# Patient Record
Sex: FEMALE | URBAN - METROPOLITAN AREA
[De-identification: names, ages, dates, MRNs, and addresses within clinical notes are randomized per-mention and may not be internally consistent; named-entity substitution may affect disease eponyms.]

---

## 2019-02-05 ENCOUNTER — APPOINTMENT (RX ONLY)
Dept: URBAN - METROPOLITAN AREA CLINIC 151 | Facility: CLINIC | Age: 74
Setting detail: DERMATOLOGY
End: 2019-02-05

## 2019-02-05 DIAGNOSIS — L43.8 OTHER LICHEN PLANUS: ICD-10-CM

## 2019-02-05 DIAGNOSIS — L64.8 OTHER ANDROGENIC ALOPECIA: ICD-10-CM

## 2019-02-05 DIAGNOSIS — L82.1 OTHER SEBORRHEIC KERATOSIS: ICD-10-CM

## 2019-02-05 PROBLEM — L85.3 XEROSIS CUTIS: Status: ACTIVE | Noted: 2019-02-05

## 2019-02-05 PROBLEM — D23.5 OTHER BENIGN NEOPLASM OF SKIN OF TRUNK: Status: ACTIVE | Noted: 2019-02-05

## 2019-02-05 PROBLEM — L29.8 OTHER PRURITUS: Status: ACTIVE | Noted: 2019-02-05

## 2019-02-05 PROBLEM — I10 ESSENTIAL (PRIMARY) HYPERTENSION: Status: ACTIVE | Noted: 2019-02-05

## 2019-02-05 PROCEDURE — ? LIQUID NITROGEN

## 2019-02-05 PROCEDURE — 17110 DESTRUCTION B9 LES UP TO 14: CPT

## 2019-02-05 PROCEDURE — ? COUNSELING

## 2019-02-05 PROCEDURE — 99203 OFFICE O/P NEW LOW 30 MIN: CPT | Mod: 25

## 2019-02-05 ASSESSMENT — LOCATION SIMPLE DESCRIPTION DERM
LOCATION SIMPLE: RIGHT POSTERIOR UPPER ARM
LOCATION SIMPLE: LEFT BUCCAL MUCOSA
LOCATION SIMPLE: RIGHT POSTERIOR UPPER ARM
LOCATION SIMPLE: RIGHT SCALP

## 2019-02-05 ASSESSMENT — LOCATION DETAILED DESCRIPTION DERM
LOCATION DETAILED: LEFT BUCCAL MUCOSA
LOCATION DETAILED: RIGHT MEDIAL FRONTAL SCALP
LOCATION DETAILED: RIGHT DISTAL POSTERIOR UPPER ARM
LOCATION DETAILED: RIGHT DISTAL POSTERIOR UPPER ARM

## 2019-02-05 ASSESSMENT — LOCATION ZONE DERM
LOCATION ZONE: SCALP
LOCATION ZONE: ARM
LOCATION ZONE: MUCOUS_MEMBRANE
LOCATION ZONE: ARM

## 2019-02-05 NOTE — HPI: EVALUATION OF SKIN LESION(S)
Hpi Title: Evaluation of Skin Lesions
Additional History: Suggested by Dr. Song.   - Left buccal cheek lesion that was biopsied as lichen planus - Gradual Hair loss s/p starting Leva-dopa for Parkinsons 3 years ago. Discussing with neurologist jason to discuss changing med.  - Lesion on left posterior forearm.

## 2019-02-05 NOTE — PROCEDURE: COUNSELING
Patient Specific Counseling (Will Not Stick From Patient To Patient): - Discussed treatment options. Since lesion is asymptomatic, I recommended that she does not need any medication at this time.
Detail Level: Detailed
Detail Level: Zone
Patient Specific Counseling (Will Not Stick From Patient To Patient): - Discussed that I doubt that it is related to her Levadopa-carbidopa medication\\n- Discussed that hair loss can be caused by stress \\n- Reassured patient that her hair loss is natural in the context of her age\\n- Recommended men’s strength Rogaine
Detail Level: Generalized

## 2019-07-24 ENCOUNTER — APPOINTMENT (RX ONLY)
Dept: URBAN - METROPOLITAN AREA CLINIC 151 | Facility: CLINIC | Age: 74
Setting detail: DERMATOLOGY
End: 2019-07-24

## 2019-07-24 DIAGNOSIS — T07XXXA INSECT BITE, NONVENOMOUS, OF OTHER, MULTIPLE, AND UNSPECIFIED SITES, WITHOUT MENTION OF INFECTION: ICD-10-CM

## 2019-07-24 PROBLEM — T07.XXXA UNSPECIFIED MULTIPLE INJURIES, INITIAL ENCOUNTER: Status: ACTIVE | Noted: 2019-07-24

## 2019-07-24 PROBLEM — D23.5 OTHER BENIGN NEOPLASM OF SKIN OF TRUNK: Status: ACTIVE | Noted: 2019-07-24

## 2019-07-24 PROCEDURE — ? COUNSELING

## 2019-07-24 PROCEDURE — 99214 OFFICE O/P EST MOD 30 MIN: CPT

## 2019-07-24 PROCEDURE — ? DIAGNOSIS COMMENT

## 2019-07-24 NOTE — PROCEDURE: DIAGNOSIS COMMENT
Comment: Reassured pt that there is no evidence of tick bite. \\nI reviewed what to look for with Lyme disease.
Detail Level: Simple

## 2019-07-24 NOTE — PROCEDURE: MIPS QUALITY
Quality 131: Pain Assessment And Follow-Up: Pain assessment using a standardized tool is documented as negative, no follow-up plan required
Detail Level: Detailed
Quality 110: Preventive Care And Screening: Influenza Immunization: Influenza Immunization Administered during Influenza season
Quality 130: Documentation Of Current Medications In The Medical Record: Current Medications Documented
Quality 431: Preventive Care And Screening: Unhealthy Alcohol Use - Screening: Patient screened for unhealthy alcohol use using a single question and scores less than 2 times per year
Quality 111:Pneumonia Vaccination Status For Older Adults: Pneumococcal Vaccination Previously Received
Quality 226: Preventive Care And Screening: Tobacco Use: Screening And Cessation Intervention: Patient screened for tobacco use and is an ex/non-smoker

## 2020-01-29 ENCOUNTER — APPOINTMENT (RX ONLY)
Dept: URBAN - METROPOLITAN AREA CLINIC 151 | Facility: CLINIC | Age: 75
Setting detail: DERMATOLOGY
End: 2020-01-29

## 2020-01-29 DIAGNOSIS — L64.8 OTHER ANDROGENIC ALOPECIA: ICD-10-CM

## 2020-01-29 DIAGNOSIS — L73.8 OTHER SPECIFIED FOLLICULAR DISORDERS: ICD-10-CM

## 2020-01-29 DIAGNOSIS — L70.8 OTHER ACNE: ICD-10-CM

## 2020-01-29 PROCEDURE — ? COUNSELING

## 2020-01-29 PROCEDURE — 99214 OFFICE O/P EST MOD 30 MIN: CPT

## 2020-01-29 ASSESSMENT — LOCATION SIMPLE DESCRIPTION DERM
LOCATION SIMPLE: RIGHT ZYGOMA
LOCATION SIMPLE: RIGHT SCALP
LOCATION SIMPLE: RIGHT LOWER BACK

## 2020-01-29 ASSESSMENT — LOCATION DETAILED DESCRIPTION DERM
LOCATION DETAILED: RIGHT MEDIAL ZYGOMA
LOCATION DETAILED: RIGHT INFERIOR MEDIAL MIDBACK
LOCATION DETAILED: RIGHT MEDIAL FRONTAL SCALP

## 2020-01-29 ASSESSMENT — LOCATION ZONE DERM
LOCATION ZONE: TRUNK
LOCATION ZONE: FACE
LOCATION ZONE: SCALP

## 2020-01-29 NOTE — PROCEDURE: COUNSELING
Patient Specific Counseling (Will Not Stick From Patient To Patient): - Discussed that I doubt that it is related to her Levadopa-carbidopa medication\\n- Discussed that hair loss can be caused by stress \\n- Reassured patient that her hair loss is natural in the context of her age\\n- Recommended men’s strength Rogaine
Detail Level: Zone
Detail Level: Simple

## 2020-01-29 NOTE — HPI: HAIR LOSS
Previous Labs: Yes
How Severe Is Your Hair Loss?: moderate
Additional History: Was given recommendation for Rogaine, but has not used it.

## 2020-06-29 ENCOUNTER — IMPORTED ENCOUNTER (OUTPATIENT)
Dept: URBAN - METROPOLITAN AREA CLINIC 88 | Facility: CLINIC | Age: 75
End: 2020-06-29

## 2021-05-12 ENCOUNTER — APPOINTMENT (RX ONLY)
Dept: URBAN - METROPOLITAN AREA CLINIC 151 | Facility: CLINIC | Age: 76
Setting detail: DERMATOLOGY
End: 2021-05-12

## 2021-05-12 DIAGNOSIS — L72.0 EPIDERMAL CYST: ICD-10-CM

## 2021-05-12 DIAGNOSIS — L64.8 OTHER ANDROGENIC ALOPECIA: ICD-10-CM

## 2021-05-12 DIAGNOSIS — L30.4 ERYTHEMA INTERTRIGO: ICD-10-CM | Status: INADEQUATELY CONTROLLED

## 2021-05-12 PROCEDURE — ? PRESCRIPTION MEDICATION MANAGEMENT

## 2021-05-12 PROCEDURE — ? DIAGNOSIS COMMENT

## 2021-05-12 PROCEDURE — ? SURGICAL DECISION MAKING

## 2021-05-12 PROCEDURE — ? OBSERVATION AND MEASURE

## 2021-05-12 PROCEDURE — ? PRESCRIPTION

## 2021-05-12 PROCEDURE — ? COUNSELING

## 2021-05-12 PROCEDURE — 99213 OFFICE O/P EST LOW 20 MIN: CPT

## 2021-05-12 RX ORDER — NYSTATIN 100000 [USP'U]/G
CREAM TOPICAL
Qty: 1 | Refills: 3 | Status: ERX | COMMUNITY
Start: 2021-05-12

## 2021-05-12 RX ADMIN — NYSTATIN: 100000 CREAM TOPICAL at 00:00

## 2021-05-12 ASSESSMENT — LOCATION DETAILED DESCRIPTION DERM: LOCATION DETAILED: LEFT SUPRAPUBIC SKIN

## 2021-05-12 ASSESSMENT — LOCATION SIMPLE DESCRIPTION DERM: LOCATION SIMPLE: GROIN

## 2021-05-12 ASSESSMENT — LOCATION ZONE DERM: LOCATION ZONE: TRUNK

## 2021-05-12 NOTE — PROCEDURE: PRESCRIPTION MEDICATION MANAGEMENT
Initiate Treatment: Rogaine 5% foam daily\\nmultivitamin PO daily
Discontinue Regimen: Biotin 10,000 mcg PO daily
Render In Strict Bullet Format?: No
Detail Level: Zone
Initiate Treatment: Nystatin cream BID PRN

## 2021-05-12 NOTE — PROCEDURE: OBSERVATION
Body Location Override (Optional - Billing Will Still Be Based On Selected Body Map Location If Applicable): L medial pubis
Detail Level: Detailed
Size Of Lesion: 7mm
Morphology Per Location (Optional): cystic mobile nodule with a centrally plugged pore

## 2021-05-12 NOTE — HPI: OTHER
Condition:: Multiple
Please Describe Your Condition:: Pt notes itching and redness in the inframammary that she used clobetasol cream 0.05% for a few days which resolved the itching. Pt notes last night her R axilla was red and itchy as well.\\n\\nPt notes hair loss for quite some time and she saw Dr. High once but did not like the visit. Pt is taking biotin 10,000mcg. Pt has not had any blood work done yet and is not sure if she should get a thyroid panel done. Pt is not sure of any family hx. Pt has experienced the loss of her brother and  in recent years.\\n\\nPt notes bumps on her groin after she shaved that are asymptomatic.

## 2021-05-12 NOTE — PROCEDURE: COUNSELING
Patient Specific Counseling (Will Not Stick From Patient To Patient): - Discussed that I doubt that it is related to her Levadopa-carbidopa medication\\n- Discussed that hair loss can be caused by stress \\n- Reassured patient that her hair loss is natural in the context of her age
Detail Level: Zone
Detail Level: Detailed

## 2021-05-12 NOTE — PROCEDURE: DIAGNOSIS COMMENT
Render Risk Assessment In Note?: no
Detail Level: Zone
Comment: Davonte ARELLANO, pt declines photographs
Comment: Mild

## 2021-05-12 NOTE — PROCEDURE: MIPS QUALITY
Quality 47: Advance Care Plan: Advance care planning not documented, reason not otherwise specified.
Quality 131: Pain Assessment And Follow-Up: Pain assessment using a standardized tool is documented as negative, no follow-up plan required
Detail Level: Detailed
Quality 110: Preventive Care And Screening: Influenza Immunization: Influenza Immunization Administered during Influenza season
Quality 402: Tobacco Use And Help With Quitting Among Adolescents: Patient screened for tobacco and never smoked
Quality 134: Screening For Clinical Depression And Follow-Up Plan: The patient was screened for depression and the screen was negative and no follow up required
Quality 130: Documentation Of Current Medications In The Medical Record: Current Medications Documented
Quality 431: Preventive Care And Screening: Unhealthy Alcohol Use - Screening: Patient screened for unhealthy alcohol use using a single question and scores less than 2 times per year
Quality 111:Pneumonia Vaccination Status For Older Adults: Pneumococcal Vaccination Previously Received
Quality 226: Preventive Care And Screening: Tobacco Use: Screening And Cessation Intervention: Patient screened for tobacco use and is an ex/non-smoker

## 2021-08-12 ENCOUNTER — APPOINTMENT (RX ONLY)
Dept: URBAN - METROPOLITAN AREA CLINIC 151 | Facility: CLINIC | Age: 76
Setting detail: DERMATOLOGY
End: 2021-08-12

## 2021-08-12 DIAGNOSIS — L29.8 OTHER PRURITUS: ICD-10-CM

## 2021-08-12 DIAGNOSIS — T07XXXA INSECT BITE, NONVENOMOUS, OF OTHER, MULTIPLE, AND UNSPECIFIED SITES, WITHOUT MENTION OF INFECTION: ICD-10-CM

## 2021-08-12 DIAGNOSIS — L29.89 OTHER PRURITUS: ICD-10-CM

## 2021-08-12 DIAGNOSIS — L73.8 OTHER SPECIFIED FOLLICULAR DISORDERS: ICD-10-CM

## 2021-08-12 DIAGNOSIS — D18.0 HEMANGIOMA: ICD-10-CM

## 2021-08-12 DIAGNOSIS — L64.8 OTHER ANDROGENIC ALOPECIA: ICD-10-CM

## 2021-08-12 DIAGNOSIS — L30.4 ERYTHEMA INTERTRIGO: ICD-10-CM | Status: IMPROVED

## 2021-08-12 PROBLEM — S10.86XA INSECT BITE OF OTHER SPECIFIED PART OF NECK, INITIAL ENCOUNTER: Status: ACTIVE | Noted: 2021-08-12

## 2021-08-12 PROBLEM — D23.5 OTHER BENIGN NEOPLASM OF SKIN OF TRUNK: Status: ACTIVE | Noted: 2021-08-12

## 2021-08-12 PROBLEM — D18.01 HEMANGIOMA OF SKIN AND SUBCUTANEOUS TISSUE: Status: ACTIVE | Noted: 2021-08-12

## 2021-08-12 PROCEDURE — ? TREATMENT REGIMEN

## 2021-08-12 PROCEDURE — ? DIAGNOSIS COMMENT

## 2021-08-12 PROCEDURE — ? COUNSELING

## 2021-08-12 PROCEDURE — ? PRESCRIPTION MEDICATION MANAGEMENT

## 2021-08-12 PROCEDURE — 99213 OFFICE O/P EST LOW 20 MIN: CPT

## 2021-08-12 PROCEDURE — ? ORDER TESTS

## 2021-08-12 ASSESSMENT — LOCATION SIMPLE DESCRIPTION DERM
LOCATION SIMPLE: NECK
LOCATION SIMPLE: RIGHT UPPER BACK
LOCATION SIMPLE: CHEST
LOCATION SIMPLE: LEFT AXILLARY VAULT

## 2021-08-12 ASSESSMENT — LOCATION ZONE DERM
LOCATION ZONE: AXILLAE
LOCATION ZONE: TRUNK
LOCATION ZONE: NECK

## 2021-08-12 ASSESSMENT — LOCATION DETAILED DESCRIPTION DERM
LOCATION DETAILED: LEFT AXILLARY VAULT
LOCATION DETAILED: RIGHT MID-UPPER BACK
LOCATION DETAILED: LOWER STERNUM
LOCATION DETAILED: LEFT CENTRAL LATERAL NECK

## 2021-08-12 NOTE — PROCEDURE: PRESCRIPTION MEDICATION MANAGEMENT
Initiate Treatment: Rogaine 5% foam daily\\nmultivitamin PO daily
Discontinue Regimen: Biotin 10,000 mcg PO daily
Render In Strict Bullet Format?: No
Detail Level: Zone
Detail Level: Simple
Continue Regimen: Nystatin cream BID PRN

## 2021-08-12 NOTE — HPI: OTHER
Condition:: FBSE
Please Describe Your Condition:: Pt started Rogaine for androgenetic alopecia, discontinued biotin supplements in anticipation of bloodwork.\\n\\nCrusty spot on the back is sometimes itchy.\\n\\nBite on the right neck, itchy. Pt would like reassure.

## 2021-08-12 NOTE — PROCEDURE: DIAGNOSIS COMMENT
Detail Level: Zone
Render Risk Assessment In Note?: no
Comment: Recommend PanOxyl 4% wash to the back, coupon provided
Comment: Davonte ARELLANO, pt declines photographs. Pt discontinued biotin 3 months ago, lab slip given today for thyroid panel.
Comment: Mild
Comment: Recommend nalini Matias provided

## 2021-08-12 NOTE — PROCEDURE: ORDER TESTS
Bill For Surgical Tray: no
Performing Laboratory: -[id number]
Expected Date Of Service: 08/12/2021
Billing Type: Third-Party Bill
Lab Facility: [id number]

## 2021-08-12 NOTE — PROCEDURE: COUNSELING
Detail Level: Detailed
Detail Level: Generalized
Patient Specific Counseling (Will Not Stick From Patient To Patient): - Discussed that I doubt that it is related to her Levadopa-carbidopa medication\\n- Discussed that hair loss can be caused by stress \\n- Reassured patient that her hair loss is natural in the context of her age
Detail Level: Zone
Detail Level: Simple

## 2022-04-18 ENCOUNTER — APPOINTMENT (RX ONLY)
Dept: URBAN - METROPOLITAN AREA CLINIC 151 | Facility: CLINIC | Age: 77
Setting detail: DERMATOLOGY
End: 2022-04-18

## 2022-04-18 DIAGNOSIS — D18.0 HEMANGIOMA: ICD-10-CM

## 2022-04-18 DIAGNOSIS — T07XXXA ABRASION OR FRICTION BURN OF OTHER, MULTIPLE, AND UNSPECIFIED SITES, WITHOUT MENTION OF INFECTION: ICD-10-CM

## 2022-04-18 PROBLEM — S70.922A UNSPECIFIED SUPERFICIAL INJURY OF LEFT THIGH, INITIAL ENCOUNTER: Status: ACTIVE | Noted: 2022-04-18

## 2022-04-18 PROBLEM — D23.5 OTHER BENIGN NEOPLASM OF SKIN OF TRUNK: Status: ACTIVE | Noted: 2022-04-18

## 2022-04-18 PROBLEM — D18.01 HEMANGIOMA OF SKIN AND SUBCUTANEOUS TISSUE: Status: ACTIVE | Noted: 2022-04-18

## 2022-04-18 PROCEDURE — ? PRESCRIPTION

## 2022-04-18 PROCEDURE — 99213 OFFICE O/P EST LOW 20 MIN: CPT

## 2022-04-18 PROCEDURE — ? COUNSELING

## 2022-04-18 PROCEDURE — ? DIAGNOSIS COMMENT

## 2022-04-18 RX ORDER — MUPIROCIN 20 MG/G
OINTMENT TOPICAL
Qty: 15 | Refills: 1 | Status: ERX | COMMUNITY
Start: 2022-04-18

## 2022-04-18 RX ADMIN — MUPIROCIN: 20 OINTMENT TOPICAL at 00:00

## 2022-04-18 ASSESSMENT — LOCATION ZONE DERM
LOCATION ZONE: LEG
LOCATION ZONE: TRUNK

## 2022-04-18 ASSESSMENT — LOCATION SIMPLE DESCRIPTION DERM
LOCATION SIMPLE: RIGHT UPPER BACK
LOCATION SIMPLE: LEFT POSTERIOR THIGH

## 2022-04-18 ASSESSMENT — LOCATION DETAILED DESCRIPTION DERM
LOCATION DETAILED: RIGHT SUPERIOR MEDIAL UPPER BACK
LOCATION DETAILED: LEFT PROXIMAL POSTERIOR THIGH

## 2022-04-18 NOTE — PROCEDURE: DIAGNOSIS COMMENT
Comment: RTC in 4 weeks if lesion worsens or isn’t improving.
Detail Level: Zone
Render Risk Assessment In Note?: yes

## 2023-10-14 ASSESSMENT — TONOMETRY
OD_IOP_MMHG: 13
OS_IOP_MMHG: 10
OD_IOP_MMHG: 12
OS_IOP_MMHG: 11

## 2023-10-14 ASSESSMENT — VISUAL ACUITY
OD_GLARE: 20/40
OS_SC: 20/70
OD_SC: 20/30
OS_SC: J1-2

## 2023-10-14 ASSESSMENT — PACHYMETRY
OD_CT_UM: 536
OS_CT_UM: 528

## 2024-12-16 ENCOUNTER — NEW PATIENT COMPREHENSIVE (OUTPATIENT)
Dept: URBAN - METROPOLITAN AREA CLINIC 88 | Facility: CLINIC | Age: 79
End: 2024-12-16

## 2024-12-16 DIAGNOSIS — H04.123: ICD-10-CM

## 2024-12-16 DIAGNOSIS — H52.11: ICD-10-CM

## 2024-12-16 DIAGNOSIS — G20.A2: ICD-10-CM

## 2024-12-16 DIAGNOSIS — H26.492: ICD-10-CM

## 2024-12-16 DIAGNOSIS — H40.023: ICD-10-CM

## 2024-12-16 DIAGNOSIS — H02.413: ICD-10-CM

## 2024-12-16 DIAGNOSIS — H25.811: ICD-10-CM

## 2024-12-16 DIAGNOSIS — Z96.1: ICD-10-CM

## 2024-12-16 PROCEDURE — 92083 EXTENDED VISUAL FIELD XM: CPT

## 2024-12-16 PROCEDURE — 92015 DETERMINE REFRACTIVE STATE: CPT

## 2024-12-16 PROCEDURE — 92004 COMPRE OPH EXAM NEW PT 1/>: CPT

## 2024-12-16 PROCEDURE — 92250 FUNDUS PHOTOGRAPHY W/I&R: CPT

## 2024-12-16 ASSESSMENT — TONOMETRY
OS_IOP_MMHG: 12
OD_IOP_MMHG: 13

## 2024-12-16 ASSESSMENT — VISUAL ACUITY
OS_SC: 20/80
OD_SC: 20/40

## 2025-02-06 ENCOUNTER — CATARACT EVALUATION (OUTPATIENT)
Dept: URBAN - METROPOLITAN AREA CLINIC 88 | Facility: CLINIC | Age: 80
End: 2025-02-06

## 2025-02-06 DIAGNOSIS — H40.023: ICD-10-CM

## 2025-02-06 DIAGNOSIS — H25.811: ICD-10-CM

## 2025-02-06 DIAGNOSIS — H02.413: ICD-10-CM

## 2025-02-06 DIAGNOSIS — G20.A2: ICD-10-CM

## 2025-02-06 DIAGNOSIS — H04.123: ICD-10-CM

## 2025-02-06 DIAGNOSIS — H26.492: ICD-10-CM

## 2025-02-06 DIAGNOSIS — H35.363: ICD-10-CM

## 2025-02-06 DIAGNOSIS — Z96.1: ICD-10-CM

## 2025-02-06 PROCEDURE — 92136 OPHTHALMIC BIOMETRY: CPT

## 2025-02-06 PROCEDURE — 99214 OFFICE O/P EST MOD 30 MIN: CPT

## 2025-02-06 PROCEDURE — 92134 CPTRZ OPH DX IMG PST SGM RTA: CPT

## 2025-02-06 ASSESSMENT — VISUAL ACUITY
OD_GLARE: 20/60
OS_SC: 20/200
OS_SC: J3
OS_PH: 20/25-2
OD_SC: 20/30

## 2025-02-06 ASSESSMENT — KERATOMETRY
OS_K1POWER_DIOPTERS: 45.25
OD_K2POWER_DIOPTERS: 45.75
OS_AXISANGLE2_DEGREES: 90
OD_AXISANGLE_DEGREES: 178
OS_AXISANGLE_DEGREES: 180
OD_AXISANGLE2_DEGREES: 88
OS_K2POWER_DIOPTERS: 45.75
OD_K1POWER_DIOPTERS: 45.50

## 2025-03-05 ENCOUNTER — SURGERY/PROCEDURE (OUTPATIENT)
Dept: URBAN - METROPOLITAN AREA SURGICAL CENTER 19 | Facility: SURGICAL CENTER | Age: 80
End: 2025-03-05

## 2025-03-05 DIAGNOSIS — H25.811: ICD-10-CM

## 2025-03-05 PROCEDURE — 66984 XCAPSL CTRC RMVL W/O ECP: CPT

## 2025-03-05 PROCEDURE — MISCFEMTO FEMTO

## 2025-03-06 ENCOUNTER — 1 DAY POST-OP (OUTPATIENT)
Dept: URBAN - METROPOLITAN AREA CLINIC 88 | Facility: CLINIC | Age: 80
End: 2025-03-06

## 2025-03-06 DIAGNOSIS — Z96.1: ICD-10-CM

## 2025-03-06 PROCEDURE — 99024 POSTOP FOLLOW-UP VISIT: CPT

## 2025-03-06 ASSESSMENT — KERATOMETRY
OS_AXISANGLE_DEGREES: 180
OD_AXISANGLE2_DEGREES: 88
OD_AXISANGLE_DEGREES: 178
OS_K1POWER_DIOPTERS: 45.25
OS_K2POWER_DIOPTERS: 45.75
OD_K1POWER_DIOPTERS: 45.50
OD_K2POWER_DIOPTERS: 45.75
OS_AXISANGLE2_DEGREES: 90

## 2025-03-06 ASSESSMENT — TONOMETRY: OD_IOP_MMHG: 10

## 2025-03-06 ASSESSMENT — VISUAL ACUITY: OD_SC: 20/20

## 2025-04-03 ENCOUNTER — 1 WEEK POST-OP (OUTPATIENT)
Dept: URBAN - METROPOLITAN AREA CLINIC 88 | Facility: CLINIC | Age: 80
End: 2025-04-03

## 2025-04-03 DIAGNOSIS — H26.492: ICD-10-CM

## 2025-04-03 DIAGNOSIS — H52.11: ICD-10-CM

## 2025-04-03 DIAGNOSIS — Z96.1: ICD-10-CM

## 2025-04-03 PROCEDURE — 92015 DETERMINE REFRACTIVE STATE: CPT | Mod: GY

## 2025-04-03 PROCEDURE — 99024 POSTOP FOLLOW-UP VISIT: CPT

## 2025-04-03 ASSESSMENT — KERATOMETRY
OS_AXISANGLE_DEGREES: 180
OS_K2POWER_DIOPTERS: 45.75
OS_K1POWER_DIOPTERS: 45.25
OS_AXISANGLE2_DEGREES: 90
OD_K2POWER_DIOPTERS: 45.75
OD_AXISANGLE2_DEGREES: 88
OD_AXISANGLE_DEGREES: 178
OD_K1POWER_DIOPTERS: 45.50

## 2025-04-03 ASSESSMENT — VISUAL ACUITY: OD_SC: 20/20

## 2025-04-03 ASSESSMENT — TONOMETRY: OD_IOP_MMHG: 12
